# Patient Record
Sex: MALE | Race: WHITE | NOT HISPANIC OR LATINO | URBAN - METROPOLITAN AREA
[De-identification: names, ages, dates, MRNs, and addresses within clinical notes are randomized per-mention and may not be internally consistent; named-entity substitution may affect disease eponyms.]

---

## 2021-02-11 ENCOUNTER — VIRTUAL VISIT (OUTPATIENT)
Dept: FAMILY MEDICINE | Facility: CLINIC | Age: 31
End: 2021-02-11
Payer: COMMERCIAL

## 2021-02-11 DIAGNOSIS — R06.83 SNORING: ICD-10-CM

## 2021-02-11 DIAGNOSIS — R06.81 WITNESSED EPISODE OF APNEA: Primary | ICD-10-CM

## 2021-02-11 PROBLEM — F90.0 ATTENTION DEFICIT HYPERACTIVITY DISORDER (ADHD), PREDOMINANTLY INATTENTIVE TYPE: Status: ACTIVE | Noted: 2021-02-11

## 2021-02-11 PROBLEM — F41.9 ANXIETY: Status: ACTIVE | Noted: 2020-02-03

## 2021-02-11 PROBLEM — R73.03 PREDIABETES: Status: ACTIVE | Noted: 2019-10-15

## 2021-02-11 PROCEDURE — 99203 OFFICE O/P NEW LOW 30 MIN: CPT | Mod: 95 | Performed by: NURSE PRACTITIONER

## 2021-02-11 RX ORDER — METHYLPHENIDATE HYDROCHLORIDE 20 MG/1
TABLET ORAL
COMMUNITY
Start: 2021-01-14

## 2021-02-11 RX ORDER — MIRTAZAPINE 30 MG/1
TABLET, FILM COATED ORAL
COMMUNITY
Start: 2019-01-22

## 2021-02-11 SDOH — HEALTH STABILITY: MENTAL HEALTH: HOW OFTEN DO YOU HAVE A DRINK CONTAINING ALCOHOL?: NEVER

## 2021-02-11 NOTE — PROGRESS NOTES
"Dov is a 30 year old who is being evaluated via a billable video visit.      How would you like to obtain your AVS? MyChart  If the video visit is dropped, the invitation should be resent by: Text to cell phone: [phone]  Will anyone else be joining your video visit? No    Video Start Time: 4:16 PM    Assessment & Plan     Witnessed episode of apnea  Comment: Certainly sounds like he could meet diagnostic criteria for ROMAIN. Will have him see sleep medicine for workup and treatment planning. Will assist as needed. Will see for fasting physical in the coming months.   - SLEEP EVALUATION & MANAGEMENT REFERRAL - [facility] [phone]  (Age 18 and up)    Snoring  Comment: Certainly sounds like he could meet diagnostic criteria for ROMAIN. Will have him see sleep medicine for workup and treatment planning. Will assist as needed. Will see for fasting physical in the coming months.   - SLEEP EVALUATION & MANAGEMENT REFERRAL - [facility] [phone]  (Age 18 and up)         See Patient Instructions    Return in about 3 months (around 5/11/2021) for Physical Exam.    Adan King NP  [facility] [place]    Subjective   Dov is a 30 year old who presents for the following health issues       Concern - Referral  Onset:   Description: pt would like to have a sleep study done.  Pt wife says that he snores very loud, she can hear him when she has ear plugs in, she also says that he does stop breathing at times.  Intensity: moderate  Progression of Symptoms:  worsening  Accompanying Signs & Symptoms: none  Previous history of similar problem: none  Precipitating factors:        Worsened by: none  Alleviating factors:        Improved by: none  Therapies tried and outcome:  none     HPI: Dov presents today with the concern of potential sleep apnea. He is, by self-description, obese (stating a weight of 281 lb). He is 6' 3\". He states that his " wife says he snores very loudly and that he often stops breathing / gasps during sleep. He has been told that he has prediabetes and borderline high blood pressure. No other issues or complaints today.     Review of Systems   Constitutional, HEENT, cardiovascular, pulmonary, neuro, endocrine systems are negative, except as otherwise noted.      Objective           Vitals:  No vitals were obtained today due to virtual visit.    Physical Exam   GENERAL: Healthy, alert and no distress  EYES: Eyes grossly normal to inspection.  No discharge or erythema, or obvious scleral/conjunctival abnormalities.  RESP: No audible wheeze, cough, or visible cyanosis.  No visible retractions or increased work of breathing.    SKIN: Visible skin clear. No significant rash, abnormal pigmentation or lesions.  NEURO: Cranial nerves grossly intact.  Mentation and speech appropriate for age.  PSYCH: Mentation appears normal, affect normal/bright, judgement and insight intact, normal speech and appearance well-groomed.              Video-Visit Details    Type of service:  Video Visit    Video End Time:4:26 PM    Originating Location (pt. Location): Home    Distant Location (provider location):  [facility]     Platform used for Video Visit: Cerelink

## 2021-03-07 ENCOUNTER — HEALTH MAINTENANCE LETTER (OUTPATIENT)
Age: 31
End: 2021-03-07

## 2021-10-11 ENCOUNTER — HEALTH MAINTENANCE LETTER (OUTPATIENT)
Age: 31
End: 2021-10-11

## 2021-11-29 ENCOUNTER — TRANSFERRED RECORDS (OUTPATIENT)
Dept: HEALTH INFORMATION MANAGEMENT | Facility: CLINIC | Age: 31
End: 2021-11-29
Payer: COMMERCIAL

## 2021-12-01 ENCOUNTER — MEDICAL CORRESPONDENCE (OUTPATIENT)
Dept: HEALTH INFORMATION MANAGEMENT | Facility: CLINIC | Age: 31
End: 2021-12-01
Payer: COMMERCIAL

## 2021-12-02 ENCOUNTER — TRANSCRIBE ORDERS (OUTPATIENT)
Dept: OTHER | Age: 31
End: 2021-12-02
Payer: COMMERCIAL

## 2021-12-02 DIAGNOSIS — Z31.9 ENCOUNTER FOR PROCREATIVE MANAGEMENT, UNSPECIFIED: Primary | ICD-10-CM

## 2021-12-06 ENCOUNTER — PRE VISIT (OUTPATIENT)
Dept: UROLOGY | Facility: CLINIC | Age: 31
End: 2021-12-06
Payer: COMMERCIAL

## 2021-12-06 NOTE — TELEPHONE ENCOUNTER
Action 12/06/21 MMT   Action Taken  CSS received incoming referral and records from [facility]. Records sent to scanning. Patient is scheduled on 2/17/22 with Dr. Hylton.

## 2022-01-25 ENCOUNTER — PRE VISIT (OUTPATIENT)
Dept: UROLOGY | Facility: CLINIC | Age: 32
End: 2022-01-25
Payer: COMMERCIAL

## 2022-02-17 ENCOUNTER — VIRTUAL VISIT (OUTPATIENT)
Dept: UROLOGY | Facility: CLINIC | Age: 32
End: 2022-02-17
Attending: OBSTETRICS & GYNECOLOGY
Payer: COMMERCIAL

## 2022-02-17 DIAGNOSIS — N46.11 OLIGOASTHENOTERATOSPERMIA: Primary | ICD-10-CM

## 2022-02-17 PROCEDURE — 99203 OFFICE O/P NEW LOW 30 MIN: CPT | Mod: 95 | Performed by: UROLOGY

## 2022-02-17 NOTE — NURSING NOTE
Chief Complaint   Patient presents with     Consult     abnormal SA       There were no vitals taken for this visit. There is no height or weight on file to calculate BMI.    Patient Active Problem List   Diagnosis     Anxiety     Prediabetes     Attention deficit hyperactivity disorder (ADHD), predominantly inattentive type       No Known Allergies    Current Outpatient Medications   Medication Sig Dispense Refill     methylphenidate (RITALIN) 20 MG tablet TAKE 1 TABLET BY MOUTH THREE TIMES DAILY       mirtazapine (REMERON) 30 MG tablet          Social History     Tobacco Use     Smoking status: Never Smoker     Smokeless tobacco: Never Used   Substance Use Topics     Alcohol use: Never     Drug use: Never       Josue Norman EMT  Chief Complaint   Patient presents with     Consult     abnormal SA       There were no vitals taken for this visit. There is no height or weight on file to calculate BMI.    Patient Active Problem List   Diagnosis     Anxiety     Prediabetes     Attention deficit hyperactivity disorder (ADHD), predominantly inattentive type       No Known Allergies    Current Outpatient Medications   Medication Sig Dispense Refill     methylphenidate (RITALIN) 20 MG tablet TAKE 1 TABLET BY MOUTH THREE TIMES DAILY       mirtazapine (REMERON) 30 MG tablet          Social History     Tobacco Use     Smoking status: Never Smoker     Smokeless tobacco: Never Used   Substance Use Topics     Alcohol use: Never     Drug use: Never       Josue Norman EMT  2/17/2022  2:45 PM  2/17/2022  2:44 PM

## 2022-02-17 NOTE — LETTER
2/17/2022       RE: Dov Ford  [address]  [place] [address]     Dear Colleague,    Thank you for referring your patient, Dov Ford, to the [facility] UROLOGY CLINIC [place] at [facility]. Please see a copy of my visit note below.    Dov is a 31 year old who is being evaluated via a billable video visit.      Video Visit Technology for this patient: Ld Video Visit- Patient was left in waiting room    How would you like to obtain your AVS? MyChart  If the video visit is dropped, the invitation should be resent by: Send to e-mail at: [email or web address]  Will anyone else be joining your video visit? No      Video Start Time: 3:12 PM  Video-Visit Details    Type of service:  Video Visit    Video End Time:3:29 PM    Originating Location (pt. Location): Home    Distant Location (provider location):  [facility] UROLOGY [facility]     Platform used for Video Visit: Ld     Dear Dr. Montgomery, it was my pleasure to see . Dov Ford, a 31 year old male here in consultation today for fertility evaluation.  His spouse is Deb Ford age 36 (7/10/85).    This couple has been attempting to conceive for the last 2 years, nearly. They have no previous pregnancy together.  Pregnancies with other partners: none.  They have tried timed intercourse. They have not tried IUI or IVF.    He did a home SA that suggested low numbers.    Female factors suspected: None known.  Cycles are regular.    PAST MEDICAL HISTORY:    Anxiety  adhd  Pre-DM    PAST SURG HISTORY  No history of surgery.     Medications as of 2/17/2022:  Current Outpatient Medications   Medication Sig     methylphenidate (RITALIN) 20 MG tablet TAKE 1 TABLET BY MOUTH THREE TIMES DAILY     mirtazapine (REMERON) 30 MG tablet      No current facility-administered medications for this visit.        ALLERGY:   No Known Allergies    SOCIAL HISTORY:  .  Occupation: , part time,  Window washer.  No alcohol abuse, tobacco use.   Social History     Tobacco Use     Smoking status: Never Smoker     Smokeless tobacco: Never Used   Substance Use Topics     Alcohol use: Never     Drug use: Never       REVIEW OF SYSTEMS:  Significant for feeling well at present .    Denies erectile dysfunction, ejaculatory problems, testicular pain. No vision or smell deficits, no chronic sinus or respiratory infections. No recent febrile illness, weight loss. No heat or cold intolerance, gynecomastia, or other endocrine complaints.    Otherwise, no constitutional, eye, ENT, heart, lung, GI , musculoskeletal, skin, neurologic, psychiatric, or hematologic complaints.    GONADOTOXIN EXPOSURE: Unremarkable. Otherwise negative for marijuana, heat, chemicals, pesticides, heavy metals, steroids, chemotherapy or radiation.    GENERAL PHYSICAL EXAM  Exam  General- Alert, oriented, nad.  Pleasant and conversant.  Eyes- anicteric, EOMI.  Resps- normal, non-labored.  No cough  Abdomen-  nondistended.   exam- deferred.   Neurological - no tremors  Skin - no discoloration/ lesions noted  Psychiatric - no anxiety, alert & oriented.      The rest of a comprehensive physical examination is deferred due to video visit restrictions.  Labs/imaging reviewed by me today:            ASSESSMENT:    Fertility Testing.    Testicular hypofunction - oligo-asthenoteratoospermia.    PLAN:    Hormonal panel ordered    Discussed OTC supplements to consider taking    RTC for physical exam after blood test      Thank-you for allowing me to care for your patient.  Sincerely,    Redd Hylton MD      CC: Ulises            Additional Coding Information:    Problems:  3 -- one acute uncomplicated illness or injury    Data Reviewed  Review of the result(s) of each unique test - SA x 2    Tests ordered/pendin lab tests ordered.    Notes from other providers reviewed: N/A     Independent interpretation of a test  performed by another physician/other qualified health care professional (not separately reported) - reviewed semen analysis results x 2.  Discussed assisted reproductive technology options based on the quantity and quality of sperm seen.    Level of risk:  3 -- low risk (e.g., OTC medication or observation, minor surgery without risks)    Time spent:  21 minutes spent on the date of the encounter doing chart review, history and exam, documentation and further activities per the note.  A good afternoon Video visit time included.  Dr. Hylton nice to meet you               Again, thank you for allowing me to participate in the care of your patient.      Sincerely,    Redd Hylton MD

## 2022-02-17 NOTE — PROGRESS NOTES
Dov is a 31 year old who is being evaluated via a billable video visit.      Video Visit Technology for this patient: Ld Video Visit- Patient was left in waiting room    How would you like to obtain your AVS? MyChart  If the video visit is dropped, the invitation should be resent by: Send to e-mail at: [email or web address]  Will anyone else be joining your video visit? No      Video Start Time: 3:12 PM  Video-Visit Details    Type of service:  Video Visit    Video End Time:3:29 PM    Originating Location (pt. Location): Home    Distant Location (provider location):  [facility] UROLOGY CLINIC [place]     Platform used for Video Visit: Ld     Dear Dr. Montgomery, it was my pleasure to see . Dov Ford, a 31 year old male here in consultation today for fertility evaluation.  His spouse is Deb Ford age 36 (7/10/85).    This couple has been attempting to conceive for the last 2 years, nearly. They have no previous pregnancy together.  Pregnancies with other partners: none.  They have tried timed intercourse. They have not tried IUI or IVF.    He did a home SA that suggested low numbers.    Female factors suspected: None known.  Cycles are regular.    PAST MEDICAL HISTORY:    Anxiety  adhd  Pre-DM    PAST SURG HISTORY  No history of surgery.     Medications as of 2/17/2022:  Current Outpatient Medications   Medication Sig     methylphenidate (RITALIN) 20 MG tablet TAKE 1 TABLET BY MOUTH THREE TIMES DAILY     mirtazapine (REMERON) 30 MG tablet      No current facility-administered medications for this visit.        ALLERGY:   No Known Allergies    SOCIAL HISTORY:  . Occupation: , part time,  Window washer.  No alcohol abuse, tobacco use.   Social History     Tobacco Use     Smoking status: Never Smoker     Smokeless tobacco: Never Used   Substance Use Topics     Alcohol use: Never     Drug use: Never       REVIEW OF SYSTEMS:  Significant for feeling well at present .    Denies  erectile dysfunction, ejaculatory problems, testicular pain. No vision or smell deficits, no chronic sinus or respiratory infections. No recent febrile illness, weight loss. No heat or cold intolerance, gynecomastia, or other endocrine complaints.    Otherwise, no constitutional, eye, ENT, heart, lung, GI , musculoskeletal, skin, neurologic, psychiatric, or hematologic complaints.    GONADOTOXIN EXPOSURE: Unremarkable. Otherwise negative for marijuana, heat, chemicals, pesticides, heavy metals, steroids, chemotherapy or radiation.    GENERAL PHYSICAL EXAM  Exam  General- Alert, oriented, nad.  Pleasant and conversant.  Eyes- anicteric, EOMI.  Resps- normal, non-labored.  No cough  Abdomen-  nondistended.   exam- deferred.   Neurological - no tremors  Skin - no discoloration/ lesions noted  Psychiatric - no anxiety, alert & oriented.      The rest of a comprehensive physical examination is deferred due to video visit restrictions.  Labs/imaging reviewed by me today:            ASSESSMENT:    Fertility Testing.    Testicular hypofunction - oligo-asthenoteratoospermia.    PLAN:    Hormonal panel ordered    Discussed OTC supplements to consider taking    RTC for physical exam after blood test      Thank-you for allowing me to care for your patient.  Sincerely,    Redd Hylton MD      CC: Ulises            Additional Coding Information:    Problems:  3 -- one acute uncomplicated illness or injury    Data Reviewed  Review of the result(s) of each unique test - SA x 2    Tests ordered/pendin lab tests ordered.    Notes from other providers reviewed: N/A     Independent interpretation of a test performed by another physician/other qualified health care professional (not separately reported) - reviewed semen analysis results x 2.  Discussed assisted reproductive technology options based on the quantity and quality of sperm seen.    Level of risk:  3 -- low risk (e.g., OTC medication or observation, minor surgery  without risks)    Time spent:  21 minutes spent on the date of the encounter doing chart review, history and exam, documentation and further activities per the note.  A good afternoon Video visit time included.  Dr. Hylton nice to meet you

## 2022-02-18 NOTE — PATIENT INSTRUCTIONS
Please get a blood draw at any [facility] lab and then return to the clinic with Dr. Hylton for physical exam and results review.    It was a pleasure meeting with you today.  Thank you for allowing me and my team the privilege of caring for you today.  YOU are the reason we are here, and I truly hope we provided you with the excellent service you deserve.  Please let us know if there is anything else we can do for you so that we can be sure you are leaving completely satisfied with your care experience.

## 2022-03-27 ENCOUNTER — HEALTH MAINTENANCE LETTER (OUTPATIENT)
Age: 32
End: 2022-03-27

## 2022-03-31 ENCOUNTER — PRE VISIT (OUTPATIENT)
Dept: UROLOGY | Facility: CLINIC | Age: 32
End: 2022-03-31

## 2022-03-31 NOTE — TELEPHONE ENCOUNTER
Reason for visit: Follow up    Relevant information: infertility    Records/imaging/labs/orders: in Epic; blood work scheduled    Pt called: no    At Rooming: normal

## 2022-04-15 ENCOUNTER — OFFICE VISIT (OUTPATIENT)
Dept: UROLOGY | Facility: CLINIC | Age: 32
End: 2022-04-15
Payer: COMMERCIAL

## 2022-04-15 ENCOUNTER — LAB (OUTPATIENT)
Dept: LAB | Facility: CLINIC | Age: 32
End: 2022-04-15
Attending: UROLOGY

## 2022-04-15 VITALS
BODY MASS INDEX: 35.94 KG/M2 | HEIGHT: 74 IN | HEART RATE: 83 BPM | DIASTOLIC BLOOD PRESSURE: 85 MMHG | SYSTOLIC BLOOD PRESSURE: 124 MMHG | WEIGHT: 280 LBS

## 2022-04-15 DIAGNOSIS — E66.01 MORBID OBESITY (H): ICD-10-CM

## 2022-04-15 DIAGNOSIS — N46.11 OLIGOASTHENOTERATOSPERMIA: ICD-10-CM

## 2022-04-15 DIAGNOSIS — E29.1 TESTICULAR HYPOFUNCTION: Primary | ICD-10-CM

## 2022-04-15 LAB
FSH SERPL-ACNC: 2.3 IU/L (ref 0.7–10.8)
LH SERPL-ACNC: 5 IU/L (ref 1.5–9.3)
PROLACTIN SERPL-MCNC: 4 UG/L (ref 2–18)
SHBG SERPL-SCNC: 22 NMOL/L (ref 11–80)
TSH SERPL DL<=0.005 MIU/L-ACNC: 1.47 MU/L (ref 0.4–4)

## 2022-04-15 PROCEDURE — 83001 ASSAY OF GONADOTROPIN (FSH): CPT | Mod: 90 | Performed by: PATHOLOGY

## 2022-04-15 PROCEDURE — 82670 ASSAY OF TOTAL ESTRADIOL: CPT | Mod: 90 | Performed by: PATHOLOGY

## 2022-04-15 PROCEDURE — 84146 ASSAY OF PROLACTIN: CPT | Mod: 90 | Performed by: PATHOLOGY

## 2022-04-15 PROCEDURE — 83002 ASSAY OF GONADOTROPIN (LH): CPT | Mod: 90 | Performed by: PATHOLOGY

## 2022-04-15 PROCEDURE — 84270 ASSAY OF SEX HORMONE GLOBUL: CPT | Mod: 90 | Performed by: PATHOLOGY

## 2022-04-15 PROCEDURE — 84443 ASSAY THYROID STIM HORMONE: CPT | Performed by: PATHOLOGY

## 2022-04-15 PROCEDURE — 36415 COLL VENOUS BLD VENIPUNCTURE: CPT | Performed by: PATHOLOGY

## 2022-04-15 PROCEDURE — 84403 ASSAY OF TOTAL TESTOSTERONE: CPT | Mod: 90 | Performed by: PATHOLOGY

## 2022-04-15 PROCEDURE — 99000 SPECIMEN HANDLING OFFICE-LAB: CPT | Performed by: PATHOLOGY

## 2022-04-15 PROCEDURE — 99213 OFFICE O/P EST LOW 20 MIN: CPT | Performed by: UROLOGY

## 2022-04-15 ASSESSMENT — PAIN SCALES - GENERAL: PAINLEVEL: NO PAIN (0)

## 2022-04-15 NOTE — NURSING NOTE
"Chief Complaint   Patient presents with     Follow Up     Infertility       Blood pressure 124/85, pulse 83, height 1.88 m (6' 2\"), weight 127 kg (280 lb). Body mass index is 35.95 kg/m .    Patient Active Problem List   Diagnosis     Anxiety     Prediabetes     Attention deficit hyperactivity disorder (ADHD), predominantly inattentive type       No Known Allergies    Current Outpatient Medications   Medication Sig Dispense Refill     methylphenidate (RITALIN) 20 MG tablet TAKE 1 TABLET BY MOUTH THREE TIMES DAILY       mirtazapine (REMERON) 30 MG tablet          Social History     Tobacco Use     Smoking status: Never Smoker     Smokeless tobacco: Never Used   Substance Use Topics     Alcohol use: Never     Drug use: Never       Estefania Vazquez, EMT  4/15/2022  11:08 AM  "

## 2022-04-15 NOTE — PATIENT INSTRUCTIONS
Please schedule an ultrasound. Dr. Hylton will contact you via [facility] with results.    It was a pleasure meeting with you today.  Thank you for allowing me and my team the privilege of caring for you today.  YOU are the reason we are here, and I truly hope we provided you with the excellent service you deserve.  Please let us know if there is anything else we can do for you so that we can be sure you are leaving completely satisfied with your care experience.

## 2022-04-15 NOTE — PROGRESS NOTES
"HPI:  Dov Ford is a 32 year old male being seen for follow-up infertility.  His spouse is Deb Ford age 36 (7/10/85).      Exam:  Physical Exam  /85   Pulse 83   Ht 1.88 m (6' 2\")   Wt 127 kg (280 lb)   BMI 35.95 kg/m      General: Alert, oriented, nad.  Pleasant and conversant.  Eyes: anicteric, EOMI.  Pulse: regular  Resps: normal, non-labored.  Abdomen:  Nondistended.  Neurological - no tremors  Skin - no discoloration/ lesions noted   exam   Phallus normal   Testes ++, anodular, nontender.  Vas and epididymis present and normal bilaterally  Probable bilateral grade II varicocele noted.  SONAM deferred.     Review of Imaging:  The following imaging exams were independently viewed and interpreted by me and discussed with patient:  N/A     Review of Labs:  The following labs were reviewed by me and discussed with the patient:  Recent Results (from the past 720 hour(s))   Follicle stimulating hormone    Collection Time: 04/15/22 10:13 AM   Result Value Ref Range    FSH 2.3 0.7 - 10.8 IU/L   Luteinizing Hormone, Adult    Collection Time: 04/15/22 10:13 AM   Result Value Ref Range    Lutropin 5.0 1.5 - 9.3 IU/L   Prolactin    Collection Time: 04/15/22 10:13 AM   Result Value Ref Range    Prolactin 4 2 - 18 ug/L   TSH with free T4 reflex    Collection Time: 04/15/22 10:13 AM   Result Value Ref Range    TSH 1.47 0.40 - 4.00 mU/L   Sex Hormone Binding Globulin    Collection Time: 04/15/22 10:13 AM   Result Value Ref Range    Sex Hormone Binding Globulin 22 11 - 80 nmol/L         Assessment & Plan   1. Follow-up infertility  a. Exam shows probable bilateral varicoceles   b. Discussed risks, benefits, and alternatives of varicocele repair, including various methods.  I counseled him extensively on the nature of varicoceles, and their possible effects on pain, fertility, and testosterone production.  I described surgery and embolization approaches, and the detailed risks of surgical repair.  These " include damage to artery (ischemia), damage to lymphatics ( hydrocele), as well as risk of recurrence (~1%). Discussed that about 2/3rds of men see improved semen parameters after varicocele repair and that improvement takes at least 3 months to see.  Discussed that varicocele pain typically improves with repair, but in rare cases the testis can become sensitive after a surgical repair.   c. Hormonal panel pending, drawn today.  Evaluate for empirical therapy.  d. Schedule scrotal ultrasound, I will contact him with results.        Redd Hylton MD  [facility]OLOGY [facility] [place]      ==========================      Additional Coding Information:    Problems:  3 -- one acute uncomplicated illness or injury    Data Reviewed  3 or more studies reviewed, as listed above     Tests ordered: scrotal ultrasound     Level of risk:  3 -- low risk (e.g., OTC medication or observation, minor surgery without risks)    Time spent:  24 minutes spent on the date of the encounter doing chart review, history and exam, documentation and further activities per the note            Bilateral varicocele on exa

## 2022-04-15 NOTE — LETTER
Date:April 18, 2022      Patient was self referred, no letter generated. Do not send.        [facility] Health Information

## 2022-04-15 NOTE — LETTER
"4/15/2022       RE: Dov Ford  [address]  [place] [address]     Dear Colleague,    Thank you for referring your patient, Dov Ford, to the [facility] UROLOGY CLINIC [place] at [facility]. Please see a copy of my visit note below.    HPI:  Dov Ford is a 32 year old male being seen for follow-up infertility.  His spouse is Deb Ford age 36 (7/10/85).      Exam:  Physical Exam  /85   Pulse 83   Ht 1.88 m (6' 2\")   Wt 127 kg (280 lb)   BMI 35.95 kg/m      General: Alert, oriented, nad.  Pleasant and conversant.  Eyes: anicteric, EOMI.  Pulse: regular  Resps: normal, non-labored.  Abdomen:  Nondistended.  Neurological - no tremors  Skin - no discoloration/ lesions noted   exam   Phallus normal   Testes ++, anodular, nontender.  Vas and epididymis present and normal bilaterally  Probable bilateral grade II varicocele noted.  SONAM deferred.     Review of Imaging:  The following imaging exams were independently viewed and interpreted by me and discussed with patient:  N/A     Review of Labs:  The following labs were reviewed by me and discussed with the patient:  Recent Results (from the past 720 hour(s))   Follicle stimulating hormone    Collection Time: 04/15/22 10:13 AM   Result Value Ref Range    FSH 2.3 0.7 - 10.8 IU/L   Luteinizing Hormone, Adult    Collection Time: 04/15/22 10:13 AM   Result Value Ref Range    Lutropin 5.0 1.5 - 9.3 IU/L   Prolactin    Collection Time: 04/15/22 10:13 AM   Result Value Ref Range    Prolactin 4 2 - 18 ug/L   TSH with free T4 reflex    Collection Time: 04/15/22 10:13 AM   Result Value Ref Range    TSH 1.47 0.40 - 4.00 mU/L   Sex Hormone Binding Globulin    Collection Time: 04/15/22 10:13 AM   Result Value Ref Range    Sex Hormone Binding Globulin 22 11 - 80 nmol/L         Assessment & Plan   1. Follow-up infertility  a. Exam shows probable bilateral varicoceles   b. Discussed " risks, benefits, and alternatives of varicocele repair, including various methods.  I counseled him extensively on the nature of varicoceles, and their possible effects on pain, fertility, and testosterone production.  I described surgery and embolization approaches, and the detailed risks of surgical repair.  These include damage to artery (ischemia), damage to lymphatics ( hydrocele), as well as risk of recurrence (~1%). Discussed that about 2/3rds of men see improved semen parameters after varicocele repair and that improvement takes at least 3 months to see.  Discussed that varicocele pain typically improves with repair, but in rare cases the testis can become sensitive after a surgical repair.   c. Hormonal panel pending, drawn today.  Evaluate for empirical therapy.  d. Schedule scrotal ultrasound, I will contact him with results.        Redd Hylton MD  [facility]OLOG[facility] [place]      ==========================      Additional Coding Information:    Problems:  3 -- one acute uncomplicated illness or injury    Data Reviewed  3 or more studies reviewed, as listed above     Tests ordered: scrotal ultrasound     Level of risk:  3 -- low risk (e.g., OTC medication or observation, minor surgery without risks)    Time spent:  24 minutes spent on the date of the encounter doing chart review, history and exam, documentation and further activities per the note            Bilateral varicocele on exa        Again, thank you for allowing me to participate in the care of your patient.      Sincerely,    Redd Hylton MD

## 2022-04-17 LAB
TESTOST FREE SERPL-MCNC: 6.5 NG/DL
TESTOST SERPL-MCNC: 269 NG/DL (ref 240–950)

## 2022-04-18 ENCOUNTER — ANCILLARY PROCEDURE (OUTPATIENT)
Dept: ULTRASOUND IMAGING | Facility: CLINIC | Age: 32
End: 2022-04-18
Attending: UROLOGY
Payer: COMMERCIAL

## 2022-04-18 DIAGNOSIS — E29.1 TESTICULAR HYPOFUNCTION: ICD-10-CM

## 2022-04-18 PROCEDURE — 93976 VASCULAR STUDY: CPT | Performed by: RADIOLOGY

## 2022-04-18 PROCEDURE — 76870 US EXAM SCROTUM: CPT | Performed by: RADIOLOGY

## 2022-04-19 NOTE — RESULT ENCOUNTER NOTE
Dear Dov,     Here are your recent ultrasound results.   I reviewed the ultrasound images- I don't see any evidence of varicocele, so this was an overcall on my exam. The good news is you don't need varicocele surgery.    There will be an option for prescription medication- I will write you a MyChart message when all the blood tests are back.    Please let us know if you have any questions or concerns.     Tonja JOLLEY

## 2022-04-21 LAB — ESTRADIOL SERPL HS-MCNC: 28 PG/ML (ref 10–40)

## 2022-04-23 NOTE — RESULT ENCOUNTER NOTE
"Dear Dov,     Here are your recent results. *    Testosterone is low normal  Testosterone to estrogen ratio is normal.   Prolactin and LH are hormones that help control testosterone level, these are normal.  FSH is the signal from the brain to the testicles to drive sperm production.  Your FSH level is normal, no concerns; I prefer to see this under 7 or so. FSH over 7 typically indicates poor sperm production ability by the testes and fertility issues.     An option from the male side is to use a prescription medication like Clomid to fine tune your hormones some, and hopefully improve sperm production.  Clomid is marketed as a female fertility medication but is used commonly \"off-label\" for treating men as well.  Clomid helps the body increase stimulation to the testicle to try to improve sperm production.  Results are variable (sometimes doesn't work at all) and results take at least 3-4 months on the medication to see possible improvement in semen analysis parameters, due to the slow rate of sperm production.  Side effects are uncommon but can include decreased libido, breast tenderness, or water weight gain.  Let me know if you are interested in trying this.        Please let us know if you have any questions or concerns.     Tonja JOLLEY "

## 2022-04-25 DIAGNOSIS — E29.1 TESTICULAR HYPOFUNCTION: Primary | ICD-10-CM

## 2022-04-25 DIAGNOSIS — N46.11 OLIGOASTHENOTERATOSPERMIA: ICD-10-CM

## 2022-04-25 RX ORDER — CLOMIPHENE CITRATE 50 MG/1
TABLET ORAL
Qty: 12 TABLET | Refills: 4 | Status: SHIPPED | OUTPATIENT
Start: 2022-04-25

## 2022-09-16 ENCOUNTER — LAB (OUTPATIENT)
Dept: LAB | Facility: CLINIC | Age: 32
End: 2022-09-16
Payer: COMMERCIAL

## 2022-09-16 DIAGNOSIS — N46.11 OLIGOASTHENOTERATOSPERMIA: ICD-10-CM

## 2022-09-16 DIAGNOSIS — E29.1 TESTICULAR HYPOFUNCTION: ICD-10-CM

## 2022-09-16 LAB
FSH SERPL IRP2-ACNC: 2.6 MIU/ML (ref 1.5–12.4)
HCT VFR BLD AUTO: 48.5 % (ref 40–53)
HGB BLD-MCNC: 16.2 G/DL (ref 13.3–17.7)

## 2022-09-16 PROCEDURE — 36415 COLL VENOUS BLD VENIPUNCTURE: CPT

## 2022-09-16 PROCEDURE — 83001 ASSAY OF GONADOTROPIN (FSH): CPT

## 2022-09-16 PROCEDURE — 84403 ASSAY OF TOTAL TESTOSTERONE: CPT

## 2022-09-16 PROCEDURE — 85014 HEMATOCRIT: CPT

## 2022-09-16 PROCEDURE — 85018 HEMOGLOBIN: CPT

## 2022-09-20 DIAGNOSIS — Z31.41 FERTILITY TESTING: Primary | ICD-10-CM

## 2022-09-20 LAB — TESTOST SERPL-MCNC: 442 NG/DL (ref 240–950)

## 2022-09-20 NOTE — RESULT ENCOUNTER NOTE
Needs new prescription for Clomid 25mg daily.  Return to clinic with semen analysis at his convenience.  Thank You  ATUL  ------------------------------------------------------------      Mark Monae,     Here are your recent results.   I recommend increasing the Clomid to 25mg by mouth daily.  I recommend a semen analysis at your convenience to see if the Clomid is helping improve the sperm count.  You can schedule this at your convenience: [facility] [phone] and I will send you the results.    Please let us know if you have any questions.     Tonja JOLLEY

## 2022-09-29 DIAGNOSIS — E29.1 TESTICULAR HYPOFUNCTION: Primary | ICD-10-CM

## 2022-09-29 RX ORDER — CLOMIPHENE CITRATE 50 MG/1
TABLET ORAL
Qty: 30 TABLET | Refills: 4 | Status: SHIPPED | OUTPATIENT
Start: 2022-09-29

## 2022-11-11 ENCOUNTER — LAB (OUTPATIENT)
Dept: LAB | Facility: CLINIC | Age: 32
End: 2022-11-11
Payer: COMMERCIAL

## 2022-11-11 DIAGNOSIS — Z31.41 FERTILITY TESTING: ICD-10-CM

## 2022-11-11 PROCEDURE — 89322 SEMEN ANAL STRICT CRITERIA: CPT

## 2022-11-14 LAB
ABNORMAL SPERM MORPHOLOGY: 100
ABSTINENCE DAYS: 3 DAYS (ref 2–7)
AGGLUTINATION: NO
ANALYSIS TEMP - CENTIGRADE: 22 CENTIGRADE
COLLECTION METHOD: ABNORMAL
COLLECTION SITE: ABNORMAL
CONSENT TO RELEASE TO PARTNER: NO
DAL- RECEIVED TIME: ABNORMAL
HEAD DEFECT: 100 %
IMMOTILE: 58 %
LIQUEFIED: YES
MIDPIECE DEFECT: 39 %
NON-PROGRESSIVE MOTILITY: 9 %
NORMAL SPERM MORPHOLOGY: 0 % NORMAL FORMS
PROGRESSIVE MOTILITY: 33 %
ROUND CELLS: 0.1 MILLION/ML
SPECIMEN PH: 7.2 PH
SPECIMEN VOLUME: 5.3 ML
SPERM CONCENTRATION: 2.6 MILLION/ML
TAIL DEFECT: 3 %
TIME OF ANALYSIS: ABNORMAL
TOTAL PROGRESSIVE MOTILE NUMBER: 5 MILLION
TOTAL SPERM NUMBER: 14 MILLION
VISCOUS: NO
VITALITY: ABNORMAL

## 2022-11-17 NOTE — RESULT ENCOUNTER NOTE
Dear Dov,     Here are your recent results.     Semen analysis results are about the same as a year ago, a little worse if anything.  Since the Clomid is not helping, I would recommend stopping it.  I have seen a few men in rare cases have sperm counts slightly lower on Clomid and better off Clomid again.  I recommend follow-up with a  female fertility specialist to discuss IUI or IVF options, since medication was not successful from the male side.    Please let us know if you have any questions or concerns.    Thank You,    Tonja JOLLEY

## 2023-01-30 ENCOUNTER — HEALTH MAINTENANCE LETTER (OUTPATIENT)
Age: 33
End: 2023-01-30

## 2024-03-02 ENCOUNTER — HEALTH MAINTENANCE LETTER (OUTPATIENT)
Age: 34
End: 2024-03-02

## 2025-03-15 ENCOUNTER — HEALTH MAINTENANCE LETTER (OUTPATIENT)
Age: 35
End: 2025-03-15